# Patient Record
Sex: FEMALE | Race: WHITE | Employment: FULL TIME | URBAN - METROPOLITAN AREA
[De-identification: names, ages, dates, MRNs, and addresses within clinical notes are randomized per-mention and may not be internally consistent; named-entity substitution may affect disease eponyms.]

---

## 2017-09-27 ENCOUNTER — APPOINTMENT (OUTPATIENT)
Dept: GENERAL RADIOLOGY | Age: 22
End: 2017-09-27
Attending: EMERGENCY MEDICINE
Payer: COMMERCIAL

## 2017-09-27 ENCOUNTER — HOSPITAL ENCOUNTER (EMERGENCY)
Age: 22
Discharge: HOME | End: 2017-09-27
Attending: EMERGENCY MEDICINE
Payer: COMMERCIAL

## 2017-09-27 VITALS
DIASTOLIC BLOOD PRESSURE: 66 MMHG | SYSTOLIC BLOOD PRESSURE: 128 MMHG | WEIGHT: 270 LBS | OXYGEN SATURATION: 96 % | TEMPERATURE: 98.3 F | RESPIRATION RATE: 20 BRPM | HEIGHT: 69 IN | BODY MASS INDEX: 39.99 KG/M2 | HEART RATE: 70 BPM
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
## 2017-09-27 DIAGNOSIS — R07.89 CHEST WALL PAIN: ICD-10-CM

## 2017-09-27 DIAGNOSIS — S46.912A SHOULDER STRAIN, LEFT, INITIAL ENCOUNTER: ICD-10-CM

## 2017-09-27 PROCEDURE — 90715 TDAP VACCINE 7 YRS/> IM: CPT | Performed by: EMERGENCY MEDICINE

## 2017-09-27 PROCEDURE — 90471 IMMUNIZATION ADMIN: CPT

## 2017-09-27 PROCEDURE — 74011250637 HC RX REV CODE- 250/637: Performed by: EMERGENCY MEDICINE

## 2017-09-27 PROCEDURE — 71020 XR CHEST PA LAT: CPT

## 2017-09-27 PROCEDURE — 73030 X-RAY EXAM OF SHOULDER: CPT

## 2017-09-27 PROCEDURE — 99283 EMERGENCY DEPT VISIT LOW MDM: CPT

## 2017-09-27 PROCEDURE — 74011250636 HC RX REV CODE- 250/636: Performed by: EMERGENCY MEDICINE

## 2017-09-27 RX ORDER — CYCLOBENZAPRINE HCL 5 MG
5 TABLET ORAL
Qty: 12 TAB | Refills: 0 | Status: SHIPPED | OUTPATIENT
Start: 2017-09-27 | End: 2018-03-21

## 2017-09-27 RX ORDER — NAPROXEN 500 MG/1
500 TABLET ORAL 2 TIMES DAILY WITH MEALS
Qty: 20 TAB | Refills: 0 | Status: SHIPPED | OUTPATIENT
Start: 2017-09-27 | End: 2018-03-21

## 2017-09-27 RX ORDER — IBUPROFEN 400 MG/1
400 TABLET ORAL
Status: COMPLETED | OUTPATIENT
Start: 2017-09-27 | End: 2017-09-27

## 2017-09-27 RX ADMIN — TETANUS TOXOID, REDUCED DIPHTHERIA TOXOID AND ACELLULAR PERTUSSIS VACCINE, ADSORBED 0.5 ML: 5; 2.5; 8; 8; 2.5 SUSPENSION INTRAMUSCULAR at 12:44

## 2017-09-27 RX ADMIN — IBUPROFEN 400 MG: 400 TABLET, FILM COATED ORAL at 12:59
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
## 2017-09-27 NOTE — LETTER
[facility] EMERGENCY DEPT 
[address] [facility] [address] 
[phone] Work/School Note Date: 9/27/2017 To Whom It May concern: 
 
Sidney Fernandes was seen and treated today in the emergency room by the following provider(s): 
No providers found. Sidney Fernandes may return to work on 9/29/17. Sincerely, 
 
 
 
 
Jose Clark
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
## 2017-09-27 NOTE — DISCHARGE INSTRUCTIONS
You should followup with primary care physician in one week if you continue to have pain. Return if new weakness/numbness, problems breathing, passing out, or other worriosme smyptoms.

## 2017-09-27 NOTE — ED NOTES
Emily Salcedo is a 19 y.o. female that was discharged in stable condition. The patients diagnosis, condition and treatment were explained to  patient and aftercare instructions were given. The patient verbalized understanding. Patient armband removed and shredded.

## 2018-03-02 ENCOUNTER — HOSPITAL ENCOUNTER (EMERGENCY)
Age: 23
Discharge: HOME | End: 2018-03-02
Attending: EMERGENCY MEDICINE
Payer: SELF-PAY

## 2018-03-02 VITALS
SYSTOLIC BLOOD PRESSURE: 134 MMHG | HEART RATE: 70 BPM | BODY MASS INDEX: 38.51 KG/M2 | RESPIRATION RATE: 16 BRPM | OXYGEN SATURATION: 98 % | DIASTOLIC BLOOD PRESSURE: 69 MMHG | HEIGHT: 69 IN | TEMPERATURE: 98.3 F | WEIGHT: 260 LBS

## 2018-03-02 DIAGNOSIS — N30.00 ACUTE CYSTITIS WITHOUT HEMATURIA: Primary | ICD-10-CM

## 2018-03-02 LAB
APPEARANCE UR: ABNORMAL
BACTERIA URNS QL MICRO: NEGATIVE /HPF
BILIRUB UR QL: NEGATIVE
COLOR UR: ABNORMAL
EPITH CASTS URNS QL MICRO: NORMAL /LPF (ref 0–5)
GLUCOSE UR STRIP.AUTO-MCNC: NEGATIVE MG/DL
HCG UR QL: NEGATIVE
HGB UR QL STRIP: ABNORMAL
KETONES UR QL STRIP.AUTO: NEGATIVE MG/DL
LEUKOCYTE ESTERASE UR QL STRIP.AUTO: ABNORMAL
NITRITE UR QL STRIP.AUTO: POSITIVE
PH UR STRIP: 7.5 [PH] (ref 5–8)
PROT UR STRIP-MCNC: ABNORMAL MG/DL
RBC #/AREA URNS HPF: NORMAL /HPF (ref 0–5)
SP GR UR REFRACTOMETRY: 1.02 (ref 1–1.03)
UROBILINOGEN UR QL STRIP.AUTO: 1 EU/DL (ref 0.2–1)
WBC URNS QL MICRO: NORMAL /HPF (ref 0–4)

## 2018-03-02 PROCEDURE — 81001 URINALYSIS AUTO W/SCOPE: CPT | Performed by: PHYSICIAN ASSISTANT

## 2018-03-02 PROCEDURE — 81025 URINE PREGNANCY TEST: CPT | Performed by: PHYSICIAN ASSISTANT

## 2018-03-02 PROCEDURE — 99283 EMERGENCY DEPT VISIT LOW MDM: CPT

## 2018-03-02 RX ORDER — CEPHALEXIN 500 MG/1
500 CAPSULE ORAL 2 TIMES DAILY
Qty: 14 CAP | Refills: 0 | Status: SHIPPED | OUTPATIENT
Start: 2018-03-02 | End: 2018-03-09

## 2018-03-02 NOTE — DISCHARGE INSTRUCTIONS

## 2018-03-02 NOTE — ED PROVIDER NOTES
EMERGENCY DEPARTMENT HISTORY AND PHYSICAL EXAM    3:03 PM      Date: 3/2/2018  Patient Name: Sharad Bull    History of Presenting Illness     Chief Complaint   Patient presents with    Urinary Pain       History Provided By: Patient    Chief Complaint: UTI  Duration:  Weeks  Timing:  Acute  Location:   Quality: Aching  Severity: Mild  Modifying Factors: urostat is not improving   Associated Symptoms: nausea without vomiting, abdominal pain      Additional History (Context):Malorie Camacho is a 23 y.o. female with a pertinent history of anxiety, obesity who presents to the emergency department for evaluation of urinary frequency, hematuria, dysuria, and lower abdominal pain x 1 week. No improvement with urostat. No vaginal complaints. no back pain. Pt denies any fevers or chills, headache, dizziness or light headedness, ENT issues, CP or discomfort, SOB, cough, v/d/c, focal weakness/numbness/tingling, or rash. Patient has no other complaints at this time. PCP:  None    Current Outpatient Prescriptions   Medication Sig Dispense Refill    cephALEXin (KEFLEX) 500 mg capsule Take 1 Cap by mouth two (2) times a day for 7 days. 14 Cap 0    naproxen (NAPROSYN) 500 mg tablet Take 1 Tab by mouth two (2) times daily (with meals). 20 Tab 0    cyclobenzaprine (FLEXERIL) 5 mg tablet Take 1 Tab by mouth three (3) times daily as needed for Muscle Spasm(s). 12 Tab 0       Past History     Past Medical History:  Past Medical History:   Diagnosis Date    Anxiety disorder     Personal history of mental disorder     Rash and other nonspecific skin eruption     Severely overweight        Past Surgical History:  History reviewed. No pertinent surgical history.     Family History:  Family History   Problem Relation Age of Onset    Diabetes Father     Hypertension Father     Heart Disease Mother     No Known Problems Brother     No Known Problems Brother        Social History:  Social History   Substance Use Topics  Smoking status: Never Smoker    Smokeless tobacco: Never Used    Alcohol use No       Allergies:  No Known Allergies      Review of Systems       Review of Systems   Constitutional: Negative for chills and fever. HENT: Negative for congestion, rhinorrhea and sore throat. Respiratory: Negative for cough and shortness of breath. Cardiovascular: Negative for chest pain. Gastrointestinal: Negative for abdominal pain, blood in stool, constipation, diarrhea, nausea and vomiting. Genitourinary: Positive for dysuria, frequency and hematuria. Musculoskeletal: Negative for back pain and myalgias. Skin: Negative for rash and wound. Neurological: Negative for dizziness and headaches. Physical Exam     Visit Vitals    /69 (BP 1 Location: Left arm)    Pulse 70    Temp 98.3 °F (36.8 °C)    Resp 16    Ht 5' 9\" (1.753 m)    Wt 117.9 kg (260 lb)    SpO2 98%    BMI 38.4 kg/m2         Physical Exam   Constitutional: She is oriented to person, place, and time. She appears well-developed and well-nourished. No distress. HENT:   Head: Normocephalic and atraumatic. Eyes: Conjunctivae and EOM are normal. Right eye exhibits no discharge. Left eye exhibits no discharge. Neck: Normal range of motion. Neck supple. Cardiovascular: Normal rate, regular rhythm and normal heart sounds. Pulmonary/Chest: Effort normal and breath sounds normal. No respiratory distress. She has no wheezes. She has no rales. She exhibits no tenderness. Abdominal: Soft. Bowel sounds are normal. She exhibits no distension. There is no tenderness. There is no rebound and no guarding. Musculoskeletal: She exhibits no edema or deformity. Neurological: She is alert and oriented to person, place, and time. She has normal reflexes. Skin: Skin is warm and dry. She is not diaphoretic. Psychiatric: She has a normal mood and affect. Nursing note and vitals reviewed.         Diagnostic Study Results     Labs -  Recent Results (from the past 12 hour(s))   URINALYSIS W/ RFLX MICROSCOPIC    Collection Time: 03/02/18  3:10 PM   Result Value Ref Range    Color DARK YELLOW      Appearance CLOUDY      Specific gravity 1.016 1.005 - 1.030      pH (UA) 7.5 5.0 - 8.0      Protein TRACE (A) NEG mg/dL    Glucose NEGATIVE  NEG mg/dL    Ketone NEGATIVE  NEG mg/dL    Bilirubin NEGATIVE  NEG      Blood TRACE (A) NEG      Urobilinogen 1.0 0.2 - 1.0 EU/dL    Nitrites POSITIVE (A) NEG      Leukocyte Esterase MODERATE (A) NEG     HCG URINE, QL    Collection Time: 03/02/18  3:10 PM   Result Value Ref Range    HCG urine, QL NEGATIVE  NEG     URINE MICROSCOPIC ONLY    Collection Time: 03/02/18  3:10 PM   Result Value Ref Range    WBC 11 to 20 0 - 4 /hpf    RBC 0 to 3 0 - 5 /hpf    Epithelial cells FEW 0 - 5 /lpf    Bacteria NEGATIVE  NEG /hpf       Radiologic Studies -   No results found. Medical Decision Making   I am the first provider for this patient. I reviewed the vital signs, available nursing notes, past medical history, past surgical history, family history and social history. Vital Signs-Reviewed the patient's vital signs. Pulse Oximetry Analysis -  98% on room air (Interpretation)    Records Reviewed: Nursing Notes and Old Medical Records (Time of Review: 3:03 PM)    ED Course: Progress Notes, Reevaluation, and Consults:    Provider Notes (Medical Decision Making):   DDX:  UTI, pyelo, urethritis, contact dermatitis, vaginitis    Plan: UA c/w infection. Will treat with keflex. At this time, patient is stable and appropriate for discharge home. Patient demonstrates understanding of current diagnoses and is in agreement with the treatment plan. They are advised that while the likelihood of serious underlying condition is low at this point given the evaluation performed today, we cannot fully rule it out. They are advised to immediately return with any new symptoms or worsening of current condition.   All questions have been answered. Patient is given educational material regarding their diagnoses, including danger symptoms and when to return to the ED. Diagnosis     Clinical Impression:   1. Acute cystitis without hematuria        Disposition: ND Home    Follow-up Information     Follow up With Details Comments Lam Call in 2 days For follow-up [facility] [id number] [phone]    [facility] EMERGENCY DEPT Go to As needed, If symptoms worsen [address]  [phone]           Patient's Medications   Start Taking    CEPHALEXIN (KEFLEX) 500 MG CAPSULE    Take 1 Cap by mouth two (2) times a day for 7 days. Continue Taking    CYCLOBENZAPRINE (FLEXERIL) 5 MG TABLET    Take 1 Tab by mouth three (3) times daily as needed for Muscle Spasm(s). NAPROXEN (NAPROSYN) 500 MG TABLET    Take 1 Tab by mouth two (2) times daily (with meals).    These Medications have changed    No medications on file   Stop Taking    No medications on file     _______________________________

## 2018-03-02 NOTE — ED NOTES
Current Discharge Medication List      START taking these medications    Details   cephALEXin (KEFLEX) 500 mg capsule Take 1 Cap by mouth two (2) times a day for 7 days. Qty: 14 Cap, Refills: 0         CONTINUE these medications which have NOT CHANGED    Details   naproxen (NAPROSYN) 500 mg tablet Take 1 Tab by mouth two (2) times daily (with meals). Qty: 20 Tab, Refills: 0      cyclobenzaprine (FLEXERIL) 5 mg tablet Take 1 Tab by mouth three (3) times daily as needed for Muscle Spasm(s). Qty: 12 Tab, Refills: 0           Patient armband removed and shredded  Prescriptions given and reviewed with patient.

## 2018-03-21 ENCOUNTER — HOSPITAL ENCOUNTER (EMERGENCY)
Age: 23
Discharge: HOME | End: 2018-03-21
Attending: EMERGENCY MEDICINE
Payer: SELF-PAY

## 2018-03-21 VITALS
WEIGHT: 260 LBS | HEART RATE: 92 BPM | RESPIRATION RATE: 18 BRPM | BODY MASS INDEX: 38.51 KG/M2 | TEMPERATURE: 98.2 F | OXYGEN SATURATION: 96 % | SYSTOLIC BLOOD PRESSURE: 133 MMHG | DIASTOLIC BLOOD PRESSURE: 73 MMHG | HEIGHT: 69 IN

## 2018-03-21 DIAGNOSIS — Z32.01 PREGNANCY TEST PERFORMED, PREGNANCY CONFIRMED: Primary | ICD-10-CM

## 2018-03-21 DIAGNOSIS — R82.71 BACTERIURIA: ICD-10-CM

## 2018-03-21 LAB
APPEARANCE UR: CLEAR
BACTERIA URNS QL MICRO: ABNORMAL /HPF
BILIRUB UR QL: NEGATIVE
COLOR UR: YELLOW
EPITH CASTS URNS QL MICRO: ABNORMAL /LPF (ref 0–5)
GLUCOSE UR STRIP.AUTO-MCNC: NEGATIVE MG/DL
HCG UR QL: POSITIVE
HGB UR QL STRIP: ABNORMAL
KETONES UR QL STRIP.AUTO: ABNORMAL MG/DL
LEUKOCYTE ESTERASE UR QL STRIP.AUTO: ABNORMAL
MUCOUS THREADS URNS QL MICRO: ABNORMAL /LPF
NITRITE UR QL STRIP.AUTO: NEGATIVE
PH UR STRIP: 6.5 [PH] (ref 5–8)
PROT UR STRIP-MCNC: 30 MG/DL
RBC #/AREA URNS HPF: ABNORMAL /HPF (ref 0–5)
SERVICE CMNT-IMP: NORMAL
SP GR UR REFRACTOMETRY: 1.03 (ref 1–1.03)
UROBILINOGEN UR QL STRIP.AUTO: 1 EU/DL (ref 0.2–1)
WBC URNS QL MICRO: ABNORMAL /HPF (ref 0–4)
WET PREP GENITAL: NORMAL

## 2018-03-21 PROCEDURE — 87491 CHLMYD TRACH DNA AMP PROBE: CPT | Performed by: PHYSICIAN ASSISTANT

## 2018-03-21 PROCEDURE — 81025 URINE PREGNANCY TEST: CPT | Performed by: PHYSICIAN ASSISTANT

## 2018-03-21 PROCEDURE — 87086 URINE CULTURE/COLONY COUNT: CPT | Performed by: PHYSICIAN ASSISTANT

## 2018-03-21 PROCEDURE — 87077 CULTURE AEROBIC IDENTIFY: CPT | Performed by: PHYSICIAN ASSISTANT

## 2018-03-21 PROCEDURE — 87186 SC STD MICRODIL/AGAR DIL: CPT | Performed by: PHYSICIAN ASSISTANT

## 2018-03-21 PROCEDURE — 87210 SMEAR WET MOUNT SALINE/INK: CPT | Performed by: PHYSICIAN ASSISTANT

## 2018-03-21 PROCEDURE — 81001 URINALYSIS AUTO W/SCOPE: CPT | Performed by: PHYSICIAN ASSISTANT

## 2018-03-21 PROCEDURE — 99284 EMERGENCY DEPT VISIT MOD MDM: CPT

## 2018-03-21 RX ORDER — CEPHALEXIN 500 MG/1
500 CAPSULE ORAL 2 TIMES DAILY
Qty: 14 CAP | Refills: 0 | Status: SHIPPED | OUTPATIENT
Start: 2018-03-21 | End: 2018-03-28

## 2018-03-21 NOTE — LETTER
3/24/2018 2:03 PM 
 
Ms. Isis Batista [address] [place] [address] Drive [address] [address] Dear Ms. Sagar King: The results of your lab work performed in our office were abnormal and we have had difficulty reaching you by telephone. Please contact our office as soon as possible to discuss these results. Sincerely, MARTHA Mcgee

## 2018-03-21 NOTE — Clinical Note
Take medication as prescribed. Follow-up with your primary care physician in 2 days for reassessment. Bring the results from this visit with your for their review. Return to the ED for any new, worsening, or persistent symptoms, including fever, vomiting , abdominal pain, or any other medical concerns.
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
## 2018-03-22 NOTE — ED PROVIDER NOTES
EMERGENCY DEPARTMENT HISTORY AND PHYSICAL EXAM    8:49 PM      Date: 3/21/2018  Patient Name: Leandro Graham    History of Presenting Illness     Chief Complaint   Patient presents with    Headache    Pregnancy Test    Vaginal Discharge         History Provided By: Patient    Chief Complaint: vaginal discharge, nausea  Duration:  Days  Timing:  Acute  Location: vaginal  Quality: n/a  Severity: Mild  Modifying Factors: none  Associated Symptoms: denies any other associated signs or symptoms      Additional History (Context): Leandro Graham is a 23 y.o. female with anxiety who presents with c/o vaginal discharge and nausea x 2 days. Pt notes she is concerned for pregnancy. Did not take a pregnancy test at home. Denies fever/chills, abdominal pain, vomiting, dysuria, hematuria, vaginal bleeding. LMP 18, . PCP: None        Past History     Past Medical History:  Past Medical History:   Diagnosis Date    Anxiety disorder     Personal history of mental disorder     Rash and other nonspecific skin eruption     Severely overweight        Past Surgical History:  History reviewed. No pertinent surgical history. Family History:  Family History   Problem Relation Age of Onset    Diabetes Father     Hypertension Father     Heart Disease Mother     No Known Problems Brother     No Known Problems Brother        Social History:  Social History   Substance Use Topics    Smoking status: Never Smoker    Smokeless tobacco: Never Used    Alcohol use No       Allergies:  No Known Allergies      Review of Systems       Review of Systems   Constitutional: Negative for chills and fever. Respiratory: Negative for shortness of breath. Cardiovascular: Negative for chest pain. Gastrointestinal: Positive for nausea. Negative for abdominal pain and vomiting. Genitourinary: Positive for vaginal discharge. Negative for dysuria, flank pain and vaginal bleeding. Skin: Negative for rash.    Neurological: Negative for weakness. All other systems reviewed and are negative. Physical Exam     Visit Vitals    /73 (BP 1 Location: Left arm, BP Patient Position: At rest)    Pulse 92    Temp 98.2 °F (36.8 °C)    Resp 18    Ht 5' 9\" (1.753 m)    Wt 117.9 kg (260 lb)    LMP 02/18/2018    SpO2 96%    Breastfeeding No    BMI 38.4 kg/m2         Physical Exam   Constitutional: She appears well-developed and well-nourished. No distress. HENT:   Head: Normocephalic and atraumatic. Neck: Normal range of motion. Neck supple. Cardiovascular: Normal rate, regular rhythm and normal heart sounds. Exam reveals no gallop and no friction rub. No murmur heard. Pulmonary/Chest: Effort normal and breath sounds normal. No respiratory distress. She has no wheezes. She has no rales. Abdominal: Soft. Bowel sounds are normal. She exhibits no distension and no mass. There is no tenderness. There is no rebound and no guarding. Genitourinary:   Genitourinary Comments: See pelvic procedure   Neurological: She is alert. Skin: Skin is warm. No rash noted. She is not diaphoretic. Nursing note and vitals reviewed.         Diagnostic Study Results     Labs -  Recent Results (from the past 12 hour(s))   URINALYSIS W/ RFLX MICROSCOPIC    Collection Time: 03/21/18  8:45 PM   Result Value Ref Range    Color YELLOW      Appearance CLEAR      Specific gravity 1.028 1.005 - 1.030      pH (UA) 6.5 5.0 - 8.0      Protein 30 (A) NEG mg/dL    Glucose NEGATIVE  NEG mg/dL    Ketone TRACE (A) NEG mg/dL    Bilirubin NEGATIVE  NEG      Blood TRACE (A) NEG      Urobilinogen 1.0 0.2 - 1.0 EU/dL    Nitrites NEGATIVE  NEG      Leukocyte Esterase MODERATE (A) NEG     HCG URINE, QL    Collection Time: 03/21/18  8:45 PM   Result Value Ref Range    HCG urine, QL POSITIVE (A) NEG     URINE MICROSCOPIC ONLY    Collection Time: 03/21/18  8:45 PM   Result Value Ref Range    WBC 11 to 20 0 - 4 /hpf    RBC 0 to 3 0 - 5 /hpf    Epithelial cells 3+ 0 - 5 /lpf    Bacteria 1+ (A) NEG /hpf    Mucus 2+ (A) NEG /lpf   WET PREP    Collection Time: 03/21/18  9:20 PM   Result Value Ref Range    Special Requests: NO SPECIAL REQUESTS      Wet prep NO YEAST,TRICHOMONAS OR CLUE CELLS NOTED         Radiologic Studies -   No orders to display         Medical Decision Making   I am the first provider for this patient. I reviewed the vital signs, available nursing notes, past medical history, past surgical history, family history and social history. Vital Signs-Reviewed the patient's vital signs. Pulse Oximetry Analysis -  96 on room air     Records Reviewed: Nursing Notes and Old Medical Records (Time of Review: 8:49 PM)    ED Course: Progress Notes, Reevaluation, and Consults:  9:54 PM: Reviewed results with patient. Will place  consult to help facilitate follow-up and insurance. Provider Notes (Medical Decision Making): 24 yo F who presents due to vaginal discharge and nausea x 2 days. Afebrile, VS stable, looks well. No abdominal tenderness. Pelvic examination without CMT or adnexal tenderness. + pregnancy test without vaginal bleeding or abdominal pain. Will d/c with Keflex for bacteruria in pregnancy and send urine culture. Will prescribe prenatal vitamins and close follow-up. Procedures: Pelvic Exam  Date/Time: 3/21/2018 8:54 PM  Performed by: PA  Procedure duration:  5 minutes. Documented by:  Gregory Kline. Exam assisted by:  Nurse Jie Villareal. Type of exam performed: speculum. External genitalia appearance: normal.    Vaginal exam:  discharge. The amount of discharge was:  moderate. The discharge was grey and mucoid. Cervical exam:  normal.    Specimen(s) collected:  chlamydia, GC and vaginal culture. Bimanual exam:  normal.    Patient tolerance: Patient tolerated the procedure well with no immediate complications              Diagnosis     Clinical Impression:   1. Pregnancy test performed, pregnancy confirmed    2. Bacteriuria        Disposition: home     Follow-up Information     Follow up With Details Comments Contact Info    [facility] EMERGENCY DEPT  If symptoms worsen [address] [id number]  [address] [facility] Call for help insurance and follow-up [facility] [address] [place] [address]    Caitlin Raman [address] [phone]             Patient's Medications   Start Taking    CEPHALEXIN (KEFLEX) 500 MG CAPSULE    Take 1 Cap by mouth two (2) times a day for 7 days. PRENATAL MULTIVIT-CA-MIN-FE-FA (PRENATAL VITAMIN) TAB    Take 1 Tab by mouth daily. Continue Taking    No medications on file   These Medications have changed    No medications on file   Stop Taking    CYCLOBENZAPRINE (FLEXERIL) 5 MG TABLET    Take 1 Tab by mouth three (3) times daily as needed for Muscle Spasm(s). NAPROXEN (NAPROSYN) 500 MG TABLET    Take 1 Tab by mouth two (2) times daily (with meals).

## 2018-03-23 LAB
C TRACH RRNA SPEC QL NAA+PROBE: POSITIVE
N GONORRHOEA RRNA SPEC QL NAA+PROBE: NEGATIVE
SPECIMEN SOURCE: ABNORMAL

## 2018-03-24 LAB
BACTERIA SPEC CULT: ABNORMAL
BACTERIA SPEC CULT: ABNORMAL
SERVICE CMNT-IMP: ABNORMAL

## 2018-03-24 RX ORDER — AZITHROMYCIN 250 MG/1
TABLET, FILM COATED ORAL
Qty: 4 TAB | Refills: 0 | Status: SHIPPED | OUTPATIENT
Start: 2018-03-24 | End: 2018-03-29

## 2018-03-24 NOTE — ED NOTES
Pt returned call and I have escribed azithromycin 1,000mg for her chlamydia. Strict instructions to f/u with OBGYN since she is pregnant.

## 2018-04-05 NOTE — ED NOTES
The L[facility] contacted the client by phone to discuss their follow up care. The [facility] scheduled the clients appointment with the [facility]. The appointment is on Thursday, April 26, 2018 @ 12 noon. The client will be seen by Dr. Mortimer Parma.

## 2018-04-07 ENCOUNTER — APPOINTMENT (OUTPATIENT)
Dept: ULTRASOUND IMAGING | Age: 23
End: 2018-04-07
Attending: NURSE PRACTITIONER
Payer: MEDICAID

## 2018-04-07 ENCOUNTER — HOSPITAL ENCOUNTER (EMERGENCY)
Age: 23
Discharge: HOME | End: 2018-04-07
Attending: EMERGENCY MEDICINE
Payer: MEDICAID

## 2018-04-07 VITALS
TEMPERATURE: 98.1 F | SYSTOLIC BLOOD PRESSURE: 125 MMHG | DIASTOLIC BLOOD PRESSURE: 48 MMHG | OXYGEN SATURATION: 98 % | HEART RATE: 79 BPM | BODY MASS INDEX: 38.51 KG/M2 | WEIGHT: 260 LBS | RESPIRATION RATE: 18 BRPM | HEIGHT: 69 IN

## 2018-04-07 DIAGNOSIS — N30.00 ACUTE CYSTITIS WITHOUT HEMATURIA: ICD-10-CM

## 2018-04-07 DIAGNOSIS — Z34.91 NORMAL INTRAUTERINE PREGNANCY ON PRENATAL ULTRASOUND IN FIRST TRIMESTER: Primary | ICD-10-CM

## 2018-04-07 DIAGNOSIS — N76.0 BV (BACTERIAL VAGINOSIS): ICD-10-CM

## 2018-04-07 DIAGNOSIS — B96.89 BV (BACTERIAL VAGINOSIS): ICD-10-CM

## 2018-04-07 LAB
ANION GAP SERPL CALC-SCNC: 12 MMOL/L (ref 3–18)
APPEARANCE UR: CLEAR
BASOPHILS # BLD: 0 K/UL (ref 0–0.06)
BASOPHILS NFR BLD: 0 % (ref 0–2)
BILIRUB UR QL: NEGATIVE
BUN SERPL-MCNC: 9 MG/DL (ref 7–18)
BUN/CREAT SERPL: 16 (ref 12–20)
CALCIUM SERPL-MCNC: 9.2 MG/DL (ref 8.5–10.1)
CHLORIDE SERPL-SCNC: 102 MMOL/L (ref 100–108)
CO2 SERPL-SCNC: 24 MMOL/L (ref 21–32)
COLOR UR: YELLOW
CREAT SERPL-MCNC: 0.55 MG/DL (ref 0.6–1.3)
DIFFERENTIAL METHOD BLD: NORMAL
EOSINOPHIL # BLD: 0.2 K/UL (ref 0–0.4)
EOSINOPHIL NFR BLD: 2 % (ref 0–5)
EPITH CASTS URNS QL MICRO: NORMAL /LPF (ref 0–5)
ERYTHROCYTE [DISTWIDTH] IN BLOOD BY AUTOMATED COUNT: 12.7 % (ref 11.6–14.5)
GLUCOSE SERPL-MCNC: 88 MG/DL (ref 74–99)
GLUCOSE UR STRIP.AUTO-MCNC: NEGATIVE MG/DL
HCG SERPL-ACNC: ABNORMAL MIU/ML (ref 1–6)
HCT VFR BLD AUTO: 42.3 % (ref 35–45)
HGB BLD-MCNC: 14.2 G/DL (ref 12–16)
HGB UR QL STRIP: ABNORMAL
KETONES UR QL STRIP.AUTO: NEGATIVE MG/DL
LEUKOCYTE ESTERASE UR QL STRIP.AUTO: ABNORMAL
LYMPHOCYTES # BLD: 3.6 K/UL (ref 0.9–3.6)
LYMPHOCYTES NFR BLD: 32 % (ref 21–52)
MCH RBC QN AUTO: 28.2 PG (ref 24–34)
MCHC RBC AUTO-ENTMCNC: 33.6 G/DL (ref 31–37)
MCV RBC AUTO: 83.9 FL (ref 74–97)
MONOCYTES # BLD: 1 K/UL (ref 0.05–1.2)
MONOCYTES NFR BLD: 9 % (ref 3–10)
NEUTS SEG # BLD: 6.4 K/UL (ref 1.8–8)
NEUTS SEG NFR BLD: 57 % (ref 40–73)
NITRITE UR QL STRIP.AUTO: NEGATIVE
PH UR STRIP: 5 [PH] (ref 5–8)
PLATELET # BLD AUTO: 313 K/UL (ref 135–420)
PMV BLD AUTO: 9.3 FL (ref 9.2–11.8)
POTASSIUM SERPL-SCNC: 3.5 MMOL/L (ref 3.5–5.5)
PROT UR STRIP-MCNC: NEGATIVE MG/DL
RBC # BLD AUTO: 5.04 M/UL (ref 4.2–5.3)
SERVICE CMNT-IMP: NORMAL
SODIUM SERPL-SCNC: 138 MMOL/L (ref 136–145)
SP GR UR REFRACTOMETRY: 1.02 (ref 1–1.03)
UROBILINOGEN UR QL STRIP.AUTO: 0.2 EU/DL (ref 0.2–1)
WBC # BLD AUTO: 11.2 K/UL (ref 4.6–13.2)
WBC URNS QL MICRO: NORMAL /HPF (ref 0–4)
WET PREP GENITAL: NORMAL

## 2018-04-07 PROCEDURE — 74011000250 HC RX REV CODE- 250: Performed by: NURSE PRACTITIONER

## 2018-04-07 PROCEDURE — 80048 BASIC METABOLIC PNL TOTAL CA: CPT | Performed by: NURSE PRACTITIONER

## 2018-04-07 PROCEDURE — 99284 EMERGENCY DEPT VISIT MOD MDM: CPT

## 2018-04-07 PROCEDURE — 76817 TRANSVAGINAL US OBSTETRIC: CPT

## 2018-04-07 PROCEDURE — 74011250636 HC RX REV CODE- 250/636: Performed by: NURSE PRACTITIONER

## 2018-04-07 PROCEDURE — 85025 COMPLETE CBC W/AUTO DIFF WBC: CPT | Performed by: NURSE PRACTITIONER

## 2018-04-07 PROCEDURE — 96374 THER/PROPH/DIAG INJ IV PUSH: CPT

## 2018-04-07 PROCEDURE — 87491 CHLMYD TRACH DNA AMP PROBE: CPT | Performed by: NURSE PRACTITIONER

## 2018-04-07 PROCEDURE — 81001 URINALYSIS AUTO W/SCOPE: CPT | Performed by: NURSE PRACTITIONER

## 2018-04-07 PROCEDURE — 84702 CHORIONIC GONADOTROPIN TEST: CPT | Performed by: NURSE PRACTITIONER

## 2018-04-07 PROCEDURE — 87086 URINE CULTURE/COLONY COUNT: CPT | Performed by: NURSE PRACTITIONER

## 2018-04-07 PROCEDURE — 87210 SMEAR WET MOUNT SALINE/INK: CPT | Performed by: NURSE PRACTITIONER

## 2018-04-07 RX ORDER — METRONIDAZOLE 500 MG/1
500 TABLET ORAL 2 TIMES DAILY
Qty: 14 TAB | Refills: 0 | Status: SHIPPED | OUTPATIENT
Start: 2018-04-07 | End: 2018-04-14

## 2018-04-07 RX ORDER — CEPHALEXIN 500 MG/1
500 CAPSULE ORAL 2 TIMES DAILY
Qty: 14 CAP | Refills: 0 | Status: SHIPPED | OUTPATIENT
Start: 2018-04-07 | End: 2018-04-14

## 2018-04-07 RX ADMIN — WATER 1 G: 1 INJECTION INTRAMUSCULAR; INTRAVENOUS; SUBCUTANEOUS at 15:40

## 2018-04-07 NOTE — LETTER
[address] [place] [address] [facility] EMERGENCY DEPT 
[address] [facility] [id number] [phone] Work/School Note Date: 4/7/2018 To Whom It May concern: 
 
Matias Echols was seen and treated today in the emergency room by the following provider(s): 
Attending Provider: Bridger Cano DO 
Nurse Practitioner: Des Pratt NP. Matias Echols may return to work on 04/08/2018. Sincerely, Des Pratt NP

## 2018-04-07 NOTE — ED NOTES
I have reviewed discharge instructions with the patient. The patient verbalized understanding. Current Discharge Medication List      START taking these medications    Details   metroNIDAZOLE (FLAGYL) 500 mg tablet Take 1 Tab by mouth two (2) times a day for 7 days. Qty: 14 Tab, Refills: 0      cephALEXin (KEFLEX) 500 mg capsule Take 1 Cap by mouth two (2) times a day for 7 days.   Qty: 14 Cap, Refills: 0         Patient armband removed and shredded

## 2018-04-07 NOTE — LETTER
[facility] EMERGENCY DEPT 
[address] [facility] [id number] 
[phone] Work/School Note Date: 4/7/2018 To Whom It May concern: 
 
Brody Jones was seen and treated today in the emergency room by the following provider(s): 
Attending Provider: Vane Laguerre DO 
Nurse Practitioner: Zane Alexis NP. Brody Jonse may return to work on 04/09/2018. Sincerely, Zane Alexis NP

## 2018-04-07 NOTE — ED PROVIDER NOTES
HPI Comments: 2:50 PM   23 y.o. female presents to ED C/O lower abdominal pain, pregnancy problem. Patient has a HX of obesity, anxiety, STIs. Patient reports she is approx 7 weeks pregnant, LMP 2/15. Patient reports lower abdominal cramping constant since last night. Patient denies dysuria, fever, hematuria, vaginal bleeding. Patient reports treated for chlamydia 2 months ago. Patient reports clear/ white discharge. No previous imagining during this pregnancy. . Patient is a nonsmoker. Patient denies any other symptoms or complaints. The history is provided by the patient. History limited by: No language barrier         Past Medical History:   Diagnosis Date    Anxiety disorder     Personal history of mental disorder     Rash and other nonspecific skin eruption     Severely overweight        History reviewed. No pertinent surgical history. Family History:   Problem Relation Age of Onset    Diabetes Father     Hypertension Father     Heart Disease Mother     No Known Problems Brother     No Known Problems Brother        Social History     Social History    Marital status: SINGLE     Spouse name: N/A    Number of children: 1    Years of education: 15     Occupational History    Not on file. Social History Main Topics    Smoking status: Never Smoker    Smokeless tobacco: Never Used    Alcohol use No    Drug use: No    Sexual activity: No     Other Topics Concern    Not on file     Social History Narrative         ALLERGIES: Review of patient's allergies indicates no known allergies. Review of Systems   Constitutional: Negative for appetite change and fever. HENT: Negative for congestion, rhinorrhea and sore throat. Respiratory: Negative for cough, shortness of breath and wheezing. Cardiovascular: Negative for chest pain and leg swelling. Gastrointestinal: Positive for nausea. Negative for abdominal pain, constipation, diarrhea and vomiting.    Genitourinary: Positive for pelvic pain and vaginal discharge. Negative for dysuria. Musculoskeletal: Negative for arthralgias and back pain. Neurological: Negative for dizziness, syncope and headaches. All other systems reviewed and are negative. Vitals:    04/07/18 1446   BP: 125/48   Pulse: 79   Resp: 18   Temp: 98.1 °F (36.7 °C)   SpO2: 98%   Weight: 117.9 kg (260 lb)   Height: 5' 9\" (1.753 m)            Physical Exam   Constitutional: She is oriented to person, place, and time. She appears well-developed and well-nourished. No distress. Eyes: Conjunctivae and EOM are normal.   Cardiovascular: Normal rate, regular rhythm and normal heart sounds. Pulmonary/Chest: Effort normal and breath sounds normal. No respiratory distress. She has no wheezes. She has no rales. Abdominal: Soft. Normal appearance and bowel sounds are normal. There is tenderness in the right lower quadrant, suprapubic area and left lower quadrant. There is no rigidity, no rebound, no guarding and no CVA tenderness. Genitourinary:   Genitourinary Comments: Please see pelvic exam   Musculoskeletal: Normal range of motion. Neurological: She is alert and oriented to person, place, and time. She exhibits normal muscle tone. Coordination normal.   Skin: Skin is warm and dry. No rash noted. She is not diaphoretic. No erythema. No pallor. Nursing note and vitals reviewed.        MDM  Number of Diagnoses or Management Options  Acute cystitis without hematuria:   BV (bacterial vaginosis):   Normal intrauterine pregnancy on prenatal ultrasound in first trimester:   Diagnosis management comments: Differential Diagnosis: appendicitis, ectopic pregnancy, normal pregnancy, TOA, PID, ovarian cyst, endometriosis, endometritis, uterine fibroids, constipation, gastroenteritis, IBS, IBD, celiac disease, diverticulitis, nephrolithiasis, pyelonephritis, cystitis, mesenteric ischemia, mesenteric adenitis, ruptured AAA, SBO, LBO    Plan: CBC, BMP, UA, pelvic exam, patient is O negative, rhogam needed if bleeding. HCG qt. Ultrasound to evaluate for ectopic  Progress - evidence of UTI, urine culture ordered,rocephine in department and discharge with keflex, no concerning CBC or BM findings, moderate clue cells noted on wet prep, due to possible affect on pregnancy will treat for BV with falgyl, patient denies symptoms associated with yeast infection, will defer to OBGYN as needed. Progress - CBC wero, moderate leuks WBC 21-35, urine culture ordered and rocephin for UTI  Ultrasound - FINDINGS: The uterus measures 10.1 x 5.2 x 5.8 cm. Within the uterus, a  gestational sac is identified. Within the gestational sac, a yolk sac is  defined. A fetal pole is identified. The fetal pole measures 0.6 cm. This is  consistent with an estimated gestational age of 7 weeks 3 days. This gives an  estimated date of confinement of 11/28/2018. Fetal cardiac activity is detected  measuring 138 bpm. Due to the fetal age, it is too early to evaluate the  amniotic fluid and placenta.     No fluid appreciated in the pelvis.      The right ovary measures 4.5 x 4.9 x 4.5 cm. Normal color and spectral Doppler  identified. No complication identified.      The left ovary measures 4.5 x 4.5 x 4.3 cm. Normal color and spectral Doppler  identified. No complication identified. 6:04 PM Patient is aware of results, will treat for cystitis and BV, referral to OBGYN for further evaluation, vaginal cultures pending, concerning for possible STI infection, will call with results. Patient treated for STI 1 month ago. Patient educated to return to the ED for any new or worsening symptoms. Questions denied. Amount and/or Complexity of Data Reviewed  Clinical lab tests: ordered and reviewed  Tests in the radiology section of CPT®: ordered and reviewed          ED Course       Pelvic Exam  Date/Time: 4/7/2018 3:05 PM  Performed by: NP  Chaperone: Swetha Ho.   Type of exam performed: bimanual and speculum. External genitalia appearance: normal.    Vaginal exam:  discharge. The amount of discharge was:  moderate. The discharge was white. Cervical exam:  discharge from cervix and no cervical motion tenderness. Specimen(s) collected:  chlamydia, GC and vaginal culture.   Patient tolerance: Patient tolerated the procedure well with no immediate complications              RESULTS:    US UTS TRANSVAGINAL OB   Final Result          Labs Reviewed   URINALYSIS W/ RFLX MICROSCOPIC - Abnormal; Notable for the following:        Result Value    Blood SMALL (*)     Leukocyte Esterase MODERATE (*)     All other components within normal limits   METABOLIC PANEL, BASIC - Abnormal; Notable for the following:     Creatinine 0.55 (*)     All other components within normal limits   BETA HCG, QT - Abnormal; Notable for the following:     [place] [address] (*)     All other components within normal limits   WET PREP   CULTURE, URINE   CBC WITH AUTOMATED DIFF   CHLAMYDIA/NEISSERIA AMPLIFICATION   URINE MICROSCOPIC ONLY       Recent Results (from the past 12 hour(s))   URINALYSIS W/ RFLX MICROSCOPIC    Collection Time: 04/07/18  3:10 PM   Result Value Ref Range    Color YELLOW      Appearance CLEAR      Specific gravity 1.018 1.005 - 1.030      pH (UA) 5.0 5.0 - 8.0      Protein NEGATIVE  NEG mg/dL    Glucose NEGATIVE  NEG mg/dL    Ketone NEGATIVE  NEG mg/dL    Bilirubin NEGATIVE  NEG      Blood SMALL (A) NEG      Urobilinogen 0.2 0.2 - 1.0 EU/dL    Nitrites NEGATIVE  NEG      Leukocyte Esterase MODERATE (A) NEG     CBC WITH AUTOMATED DIFF    Collection Time: 04/07/18  3:10 PM   Result Value Ref Range    WBC 11.2 4.6 - 13.2 K/uL    RBC 5.04 4.20 - 5.30 M/uL    HGB 14.2 12.0 - 16.0 g/dL    HCT 42.3 35.0 - 45.0 %    MCV 83.9 74.0 - 97.0 FL    MCH 28.2 24.0 - 34.0 PG    MCHC 33.6 31.0 - 37.0 g/dL    RDW 12.7 11.6 - 14.5 %    PLATELET 228 609 - 202 K/uL    MPV 9.3 9.2 - 11.8 FL    NEUTROPHILS 57 40 - 73 %    LYMPHOCYTES 32 21 - 52 %    MONOCYTES 9 3 - 10 %    EOSINOPHILS 2 0 - 5 %    BASOPHILS 0 0 - 2 %    ABS. NEUTROPHILS 6.4 1.8 - 8.0 K/UL    ABS. LYMPHOCYTES 3.6 0.9 - 3.6 K/UL    ABS. MONOCYTES 1.0 0.05 - 1.2 K/UL    ABS. EOSINOPHILS 0.2 0.0 - 0.4 K/UL    ABS. BASOPHILS 0.0 0.0 - 0.06 K/UL    DF AUTOMATED     METABOLIC PANEL, BASIC    Collection Time: 04/07/18  3:10 PM   Result Value Ref Range    Sodium 138 136 - 145 mmol/L    Potassium 3.5 3.5 - 5.5 mmol/L    Chloride 102 100 - 108 mmol/L    CO2 24 21 - 32 mmol/L    Anion gap 12 3.0 - 18 mmol/L    Glucose 88 74 - 99 mg/dL    BUN 9 7.0 - 18 MG/DL    Creatinine 0.55 (L) 0.6 - 1.3 MG/DL    BUN/Creatinine ratio 16 12 - 20      GFR est AA >60 >60 ml/min/1.73m2    GFR est non-AA >60 >60 ml/min/1.73m2    Calcium 9.2 8.5 - 10.1 MG/DL   BETA HCG, QT    Collection Time: 04/07/18  3:10 PM   Result Value [place] [address] (H) 1.0 - 6.0 MIU/ML   WET PREP    Collection Time: 04/07/18  3:10 PM   Result Value Ref Range    Special Requests: NO SPECIAL REQUESTS      Wet prep NO TRICHOMONAS SEEN      Wet prep YEAST  FEW        Wet prep CLUE CELLS PRESENT  MODERATE       URINE MICROSCOPIC ONLY    Collection Time: 04/07/18  3:10 PM   Result Value Ref Range    WBC 21 to 35 0 - 4 /hpf    Epithelial cells 2+ 0 - 5 /lpf       PROGRESS NOTE:   2:50 PM  Initial assessment completed. Written by Alfrieda Spurling NP-C    DISCHARGE NOTE:  6:06 PM   Wili Flower's  results have been reviewed with her. She has been counseled regarding her diagnosis, treatment, and plan. She verbally conveys understanding and agreement of the signs, symptoms, diagnosis, treatment and prognosis and additionally agrees to follow up as discussed. She also agrees with the care-plan and conveys that all of her questions have been answered.   I have also provided discharge instructions for her that include: educational information regarding their diagnosis and treatment, and list of reasons why they would want to return to the ED prior to their follow-up appointment, should her condition change. CLINICAL IMPRESSION:    1. Normal intrauterine pregnancy on prenatal ultrasound in first trimester    2. BV (bacterial vaginosis)    3. Acute cystitis without hematuria        AFTER VISIT PLAN:    Current Discharge Medication List      START taking these medications    Details   metroNIDAZOLE (FLAGYL) 500 mg tablet Take 1 Tab by mouth two (2) times a day for 7 days. Qty: 14 Tab, Refills: 0      cephALEXin (KEFLEX) 500 mg capsule Take 1 Cap by mouth two (2) times a day for 7 days.   Qty: 14 Cap, Refills: 0              Follow-up Information     Follow up With Details Comments Contact Info    Bismark Sy MD Schedule an appointment as soon as possible for a visit in 1 week Further evaluation [address] [place] [address] [id number] [phone]             Written by Leonardo ABDALLA

## 2018-04-09 LAB
BACTERIA SPEC CULT: NORMAL
C TRACH RRNA SPEC QL NAA+PROBE: NEGATIVE
N GONORRHOEA RRNA SPEC QL NAA+PROBE: NEGATIVE
SERVICE CMNT-IMP: NORMAL
SPECIMEN SOURCE: NORMAL

## 2018-12-02 PROBLEM — Z34.90 PREGNANCY: Status: ACTIVE | Noted: 2018-12-02

## 2019-07-15 ENCOUNTER — HOSPITAL ENCOUNTER (EMERGENCY)
Age: 24
Discharge: HOME | End: 2019-07-15
Attending: EMERGENCY MEDICINE
Payer: COMMERCIAL

## 2019-07-15 VITALS
TEMPERATURE: 98.6 F | HEIGHT: 69 IN | BODY MASS INDEX: 38.51 KG/M2 | RESPIRATION RATE: 18 BRPM | HEART RATE: 71 BPM | OXYGEN SATURATION: 100 % | WEIGHT: 260 LBS | SYSTOLIC BLOOD PRESSURE: 160 MMHG | DIASTOLIC BLOOD PRESSURE: 78 MMHG

## 2019-07-15 DIAGNOSIS — T07.XXXA MULTIPLE BRUISES: Primary | ICD-10-CM

## 2019-07-15 PROCEDURE — 99281 EMR DPT VST MAYX REQ PHY/QHP: CPT

## 2019-07-15 NOTE — ED NOTES
Marco Mason is a 20 y.o. female that was discharged in stable condition. The patients diagnosis, condition and treatment were explained to  patient and aftercare instructions were given. The patient verbalized understanding. Patient armband removed and shredded.

## 2019-07-15 NOTE — ED PROVIDER NOTES
EMERGENCY DEPARTMENT HISTORY AND PHYSICAL EXAM    Date: 7/15/2019  Patient Name: Cherie Rutherford    History of Presenting Illness     Chief Complaint   Patient presents with    Other         History Provided By: Patient    Chief Complaint: Bruises  Duration: Several days  Timing: Acute  Location: Thighs    Additional History (Context): Cherie Rutherford is a 20 y.o. female with history of anxiety and obesity who presents with complaints of bruises to bilateral thighs that she noticed 2 to 3 days ago. She states that she has not fallen, bumped into anything and does not recall why she should have bruises. Thus, she decided she wanted to get medical attention to be evaluated. She denies using aspirin, any blood thinners, and expanding ecchymosis, fevers, chills, chest pain, shortness of breath, bug bites, itching. She denies any heavy bleeding. Denies any history of bleeding disorders in herself or in her family. Denies any recent antibiotic use or new changes in medicines. PCP: None        Past History     Past Medical History:  Past Medical History:   Diagnosis Date    Anxiety disorder     Chlamydia 05/2018    Personal history of mental disorder     Rash and other nonspecific skin eruption     Severely overweight        Past Surgical History:  History reviewed. No pertinent surgical history. Family History:  Family History   Problem Relation Age of Onset    Diabetes Father     Hypertension Father     Heart Disease Mother     No Known Problems Brother     No Known Problems Brother        Social History:  Social History     Tobacco Use    Smoking status: Never Smoker    Smokeless tobacco: Never Used   Substance Use Topics    Alcohol use: No    Drug use: No       Allergies:  No Known Allergies      Review of Systems   Review of Systems   Constitutional: Negative for chills and fever. HENT: Negative. Respiratory: Negative for chest tightness and shortness of breath.     Cardiovascular: Negative for chest pain, palpitations and leg swelling. Gastrointestinal: Negative for abdominal pain, diarrhea, nausea and vomiting. Genitourinary: Negative for dysuria and frequency. Musculoskeletal: Negative for arthralgias and back pain. Skin: Positive for color change. Bruises to bilateral thighs   Neurological: Negative. Hematological: Negative for adenopathy. Does not bruise/bleed easily. Psychiatric/Behavioral: Negative. Physical Exam     Vitals:    07/15/19 0941   BP: 160/78   Pulse: 71   Resp: 18   Temp: 98.6 °F (37 °C)   SpO2: 100%   Weight: 117.9 kg (260 lb)   Height: 5' 9\" (1.753 m)     Physical Exam   Constitutional: She is oriented to person, place, and time. She appears well-developed and well-nourished. No distress. HENT:   Head: Normocephalic and atraumatic. Eyes: Pupils are equal, round, and reactive to light. EOM are normal.   Neck: Neck supple. Cardiovascular: Normal rate and regular rhythm. Exam reveals no gallop and no friction rub. No murmur heard. Pulmonary/Chest: Effort normal and breath sounds normal. No respiratory distress. She has no wheezes. She has no rales. Abdominal: Soft. Bowel sounds are normal. She exhibits no distension and no mass. There is no tenderness. There is no rebound and no guarding. Musculoskeletal: Normal range of motion. She exhibits no tenderness or deformity. Lymphadenopathy:     She has no cervical adenopathy. Neurological: She is alert and oriented to person, place, and time. No cranial nerve deficit. Skin: Skin is warm and dry. She is not diaphoretic. There are multiple small ecchymotic regions to bilateral legs. They appear to be yellowing. The are not indurated, hot to touch, erythematous, or tender to palpation. There is no surrounding purpura or petechiae. No evidence of insect bites. No vesicles, no weeping wounds, no desquamation, no necrosis, no superimposed infection.    Psychiatric: She has a normal mood and affect. Her behavior is normal.   Nursing note and vitals reviewed. Diagnostic Study Results     Labs -   No results found for this or any previous visit (from the past 12 hour(s)). Radiologic Studies -   No orders to display         Medical Decision Making   I am the first provider for this patient. I reviewed the vital signs, available nursing notes, past medical history, past surgical history, family history and social history. Vital Signs-Reviewed the patient's vital signs. Records Reviewed: Nursing Notes and Old Medical Records    Procedures:  Procedures    Provider Notes (Medical Decision Making): Impression: Multiple bruisesthey appear to be healing. There is no expanding hematoma and none are greater than 2 cm in diameter. There is no superimposed infection, induration, erythema. She is not on blood thinners, does not have a family history of bleeding disorder. She denies any heavy bleeding. Do not think that she needs further lab work today. Her vital signs are stable. We will have her follow-up with primary care. Urged her to return if any fevers, expanding hematoma, chest pain, shortness of breath or any other concerns. Patient agrees with the plan. MED RECONCILIATION:  No current facility-administered medications for this encounter. No current outpatient medications on file. Disposition:  Discharge    DISCHARGE NOTE:     Pt has been reexamined. Patient has no new complaints, changes, or physical findings. Care plan outlined and precautions discussed. Results were reviewed with the patient. All medications were reviewed with the patient; will d/c home. All of pt's questions and concerns were addressed. Patient was instructed and agrees to follow up with PCP, as well as to return to the ED upon further deterioration. Patient is ready to go home.     Follow-up Information     Follow up With Specialties Details Why Contact Info    [facility] EMERGENCY DEPT Emergency Medicine  If symptoms worsen [address] [facility]  [address]  In 1 week for primary care follow up [address] [place] [address]  [phone]          There are no discharge medications for this patient. Diagnosis     Clinical Impression:   1.  Multiple bruises

## 2019-07-15 NOTE — ED TRIAGE NOTES
Patient states she has several bruises on right thigh. She is concerned because she doesn't recall bumping into anything.

## 2020-12-01 ENCOUNTER — OFFICE VISIT (OUTPATIENT)
Dept: ORTHOPEDIC SURGERY | Age: 25
End: 2020-12-01
Payer: COMMERCIAL

## 2020-12-01 VITALS
WEIGHT: 227.8 LBS | HEART RATE: 74 BPM | OXYGEN SATURATION: 97 % | SYSTOLIC BLOOD PRESSURE: 135 MMHG | RESPIRATION RATE: 16 BRPM | BODY MASS INDEX: 33.74 KG/M2 | HEIGHT: 69 IN | DIASTOLIC BLOOD PRESSURE: 74 MMHG | TEMPERATURE: 97.3 F

## 2020-12-01 DIAGNOSIS — S93.492A SPRAIN OF ANTERIOR TALOFIBULAR LIGAMENT OF LEFT ANKLE, INITIAL ENCOUNTER: ICD-10-CM

## 2020-12-01 DIAGNOSIS — M25.572 ACUTE LEFT ANKLE PAIN: Primary | ICD-10-CM

## 2020-12-01 PROCEDURE — 99203 OFFICE O/P NEW LOW 30 MIN: CPT | Performed by: ORTHOPAEDIC SURGERY

## 2020-12-01 PROCEDURE — 73610 X-RAY EXAM OF ANKLE: CPT | Performed by: ORTHOPAEDIC SURGERY

## 2020-12-01 RX ORDER — VENLAFAXINE HYDROCHLORIDE 150 MG/1
CAPSULE, EXTENDED RELEASE ORAL
COMMUNITY
Start: 2020-11-23 | End: 2021-11-16

## 2020-12-01 NOTE — LETTER
NOTIFICATION RETURN TO WORK / SCHOOL 
 
12/1/2020 10:34 AM 
 
Ms. Norberto Hernandez [address] [facility] [id number] To Whom It May Concern: 
 
Norberto Hernandez is currently under the care of [address] [place] [address] Romeo Navas. 
 
: Please allow Ms. Matty Hinkle to continue using electric chair for ambulation. I recommend light duty position with limited standing and walking. She will be reassessed after the MRI. If there are questions or concerns please have the patient contact our office.  
 
 
 
Sincerely, 
 
 
Jason Chu MD

## 2020-12-01 NOTE — PROGRESS NOTES
AMBULATORY PROGRESS NOTE      Patient: Katina Kidd             MRN: [id number]     SSN: [id number] Body mass index is 33.64 kg/m². YOB: 1995     AGE: 30 y.o. EX: female    PCP: None       IMPRESSION //  DIAGNOSIS AND TREATMENT PLAN      DIAGNOSES  1. Acute left ankle pain    2. Sprain of anterior talofibular ligament of left ankle, initial encounter        Orders Placed This Encounter    Generic Supply Order     short cam walker boot, left foot    [24137] Ankle Min 3V     Order Specific Question:   Weight bearing? Answer:   No    MRI ANKLE LT WO CONT     Standing Status:   Future     Standing Expiration Date:   6/1/2021     Order Specific Question:   Is Patient Pregnant? Answer:   No     Order Specific Question:   Arthrogram study     Answer:   No     Order Specific Question:   Reason for Exam     Answer:   to assess anterior talus and navicular for possible sprain    venlafaxine-SR (EFFEXOR-XR) 150 mg capsule     Sig: TK 1 C PO QAM UTD      As such in this individual, who is anterior ankle pain, with ascending and descending steps, and has focal tenderness to the anterior talus and dorsal anterior portion of the navicular, I will concern about the possibly of an occult fracture to the tarsal navicular. Recommendations for MRI of her left ankle to assess the talonavicular region the dorsal part of her talus dorsal part of her navicular bone on the left side. She reports me [facility], she had [facility] of her left ankle and left foot, she had the x-rays on a CD DVD, but she misplaced this. She states she was diagnosed with having a left ankle sprain, and a foot fracture, but she cannot tell where the foot fracture was located. Again she does not have a CD DVD, I do not have any reports of [facility] from [facility].    Her injury occurred when she was running on a treadmill, the local facility, approximately November 20, 2020. PLAN:    1.  MRI of left ankle to assess anterior talus and navicular for possible sprain  2. DME order: short cam walker boot, left foot //  the CAM boot was placed on patient in the office today  3. Work Note: Please allow Ms. Samantha Dean to continue using electric chair for ambulation. I recommend light duty position with limited standing and walking. She will be reassessed after     RTO- after MRI      HPI //  OBJECTIVE EXAMINATION      Sybil Hall IS A 30 y.o. female who presents to my outpatient office for evaluation of: ankle fx sustained from an injury that occurred on 11/21/2020. [facility] presented to [facility]/2020 where she had x-ray imaging done and was dx with a sprain and a fracture. She was also given an ankle brace, which she wears at work. Patient sustained the injury on Nov 20th while running on a level treadmill at [employer]. She states that she heard a pop when in injury occurred, but due to adrenaline did not feel pain immediately, She severe pain and swelling when she got home that day. Since the injury, she continues to endorse persistent pain to anterolateral ankle along anterior navicular region. She reports popping of her ankle when she walks up the steps into her home. Patient currently works as a grocery store. She states that her job has been accommodating in letting her use electric chair to ambulate.      Visit Vitals  /74 (BP 1 Location: Left arm, BP Patient Position: Sitting)   Pulse 74   Temp 97.3 °F (36.3 °C) (Temporal)   Resp 16   Ht 5' 9\" (1.753 m)   Wt 227 lb 12.8 oz (103.3 kg)   SpO2 97%   BMI 33.64 kg/m²       ANKLE/FOOT left    Psychiatry: Alert, oriented x 3 (name,place,time of day); speech normal in context and clarity, memory intact grossly, no involuntary movements - tremors, no dementia  Gait: antalgic  Tenderness: exquisite to anterolateral gutter, anterior navicular, pain with DF   Cutaneous: WNL  Joint Motion: WNL  Joint / Tendon Stability: No Ankle or Subtalar instability or joint laxity. No peroneal sublux ability or dislocation  Alignment: neutral Hindfoot, none Metatarsus Adductus Metatarsus. Neuro Motor/Sensory: NL/NL,  Vascular: NL foot/ankle pulses,   Lymphatics: No extremity lymphedema, No calf swelling, no tenderness to calf muscles. CHART REVIEW     Patient Active Problem List   Diagnosis Code    Arrested active phase of labor O62.1    Pregnancy Z34.90        Luke Steiner has been experiencing pain and discomfort confirmed as outlined in the pain assessment outlined below. Pain Assessment  12/1/2020   Location of Pain Ankle   Location Modifiers Left   Severity of Pain 6   Quality of Pain Popping;Dull; Luis Corpus; Throbbing;Aching   Quality of Pain Comment numbness and tingling   Duration of Pain Persistent   Frequency of Pain Constant   Date [facility] 11/21/2020   Aggravating Factors Stairs; Walking;Standing   Relieving Factors Nothing   Result of Injury Yes   Work-Related Injury No   Type of Injury (No Data)   Type of Injury Comment running on the treadmil        Luke Steiner  has a past medical history of Anxiety disorder, Chlamydia (05/2018), Personal history of mental disorder, Rash and other nonspecific skin eruption, and Severely overweight. Patients is employed at:         Past Medical History:   Diagnosis Date    Anxiety disorder     Chlamydia 05/2018    Personal history of mental disorder     Rash and other nonspecific skin eruption     Severely overweight      History reviewed. No pertinent surgical history. Current Outpatient Medications   Medication Sig    venlafaxine-SR (EFFEXOR-XR) 150 mg capsule TK 1 C PO QAM UTD     No current facility-administered medications for this visit. No Known Allergies  Social History     Occupational History    Not on file   Tobacco Use    Smoking status: Never Smoker    Smokeless tobacco: Never Used   Substance and Sexual Activity    Alcohol use:  Yes Alcohol/week: 5.0 standard drinks     Types: 5 Shots of liquor per week    Drug use: No    Sexual activity: Never     Family History   Problem Relation Age of Onset    Diabetes Father     Hypertension Father     Heart Disease Mother     No Known Problems Brother     No Known Problems Brother        THE  FOR Efrem Madden BY No Rene 12/1/2020 . DIAGNOSTIC IMAGING  LAB DATA      No results found for: HBA1C, HGBE8, XBN6EVFK, NKD4NFZJ //   Lab Results   Component Value Date/Time    Glucose 93 09/12/2020 01:55 PM        No results found for: MZA2OFAD, WRP0KVIV      No results found for: VITD3, XQVID2, XQVID3, XQVID, VD3RIA, [id number]      REVIEW OF SYSTEMS : 12/1/2020  ALL BELOW ARE Negative except : SEE HPI     CONSTITUTIONAL: No weight loss  PSYCHOLOGICAL : No Feelings of anxiety, depression, agitation  EYES: No blurred vision and no eye discharge. NO eye pain, double vision  ENT: No nasal discharge. No ear pain. CARDIOVASCULAR: No chest pain and no diaphoresis. RESPIRATORY: No cough, no hemoptysis. GI: No vomiting, no diarrhea   : No urinary frequency and no dysuria. MUSCULOSKELETAL: see HPI  SKIN: No rashes. NEURO:  No dizziness,weakness, headaches// No visual changes or confusion, or seizures,   ENDOCRINE: No polyphagia and no polydipsia. HEMATOLOGY: No bleeding tendencies. DIAGNOSTIC IMAGING        ANKLE X RAYS 3 VIEWS LEFT  X RAYS AT [address] [place] [address]  12/1/2020    NON WEIGHT BEARING    X RAYS AT [address] [place] [address]  12/1/2020    Bones: No fractures or dislocations. No focal osteolytic or osteoblastic process     Bone Spurs: No significant bone spurs  //there appears to be a small bony ossicle or possible bony fragment, seen the dorsal part of the navicular bone near the talus, nondisplaced nonangulated. I see no effusion of the ankle, this current time of these x-rays.   Alignment: Ankle mortise alignment is congruent, Tibial plafond and talar dome intact. No Osteochondral defects seen   Joint: No Significant OA changes present  Soft Tissues: Normal, No radiopaque foreign body     No abnormal calcific densities to soft tissues    No ankle joint effusion in lateral projection. Mineralization: Suggests no Osteopenia    I have personally reviewed the results of the above study.  The interpretation of this study is my professional opinion      Lily Fields  12/1/2020  9:01 AM

## 2020-12-02 DIAGNOSIS — M25.572 ACUTE LEFT ANKLE PAIN: ICD-10-CM

## 2020-12-02 DIAGNOSIS — S93.492A SPRAIN OF ANTERIOR TALOFIBULAR LIGAMENT OF LEFT ANKLE, INITIAL ENCOUNTER: ICD-10-CM

## 2020-12-17 ENCOUNTER — HOSPITAL ENCOUNTER (OUTPATIENT)
Dept: MRI IMAGING | Age: 25
Discharge: HOME | End: 2020-12-17
Attending: ORTHOPAEDIC SURGERY
Payer: COMMERCIAL

## 2020-12-17 PROCEDURE — 73721 MRI JNT OF LWR EXTRE W/O DYE: CPT

## 2021-01-11 ENCOUNTER — OFFICE VISIT (OUTPATIENT)
Dept: ORTHOPEDIC SURGERY | Age: 26
End: 2021-01-11
Payer: COMMERCIAL

## 2021-01-11 VITALS
OXYGEN SATURATION: 99 % | WEIGHT: 239.2 LBS | TEMPERATURE: 96.8 F | HEIGHT: 69 IN | HEART RATE: 60 BPM | BODY MASS INDEX: 35.43 KG/M2 | RESPIRATION RATE: 16 BRPM | DIASTOLIC BLOOD PRESSURE: 69 MMHG | SYSTOLIC BLOOD PRESSURE: 121 MMHG

## 2021-01-11 DIAGNOSIS — S93.492A SPRAIN OF ANTERIOR TALOFIBULAR LIGAMENT OF LEFT ANKLE, INITIAL ENCOUNTER: Primary | ICD-10-CM

## 2021-01-11 PROCEDURE — 99214 OFFICE O/P EST MOD 30 MIN: CPT | Performed by: ORTHOPAEDIC SURGERY

## 2021-01-11 NOTE — LETTER
NOTIFICATION RETURN TO WORK / SCHOOL 
 
1/11/2021 4:11 PM 
 
Ms. Malorie Flower 
[address] [facility] [address] 
 
 
To Whom It May Concern: 
 
Malorie Flower is currently under the care of [facility]. 
 
Please allow Ms. Flower to continue with same work restrictions: light duty position with limited standing and walking. 
 
If there are questions or concerns please have the patient contact our office. 
 
 
 
Sincerely, 
 
 
Alonso Balbuena MD

## 2021-01-11 NOTE — PROGRESS NOTES
AMBULATORY PROGRESS NOTE      Patient: Oumou Faustin             MRN: [id number]     SSN: [id number] Body mass index is 35.32 kg/m². YOB: 1995     AGE: 30 y.o. EX: female    PCP: None       IMPRESSION //  DIAGNOSIS AND TREATMENT PLAN      DIAGNOSES  1. Sprain of anterior talofibular ligament of left ankle, initial encounter        Orders Placed This Encounter    Generic Supply Order     L AS/AC ankle brace    REFERRAL TO PHYSICAL THERAPY     Referral Priority:   Routine     Referral Type:   PT/OT/ST     Referral Reason:   Specialty Services Required     Requested Specialty:   Physical Therapy     Number of Visits Requested:   1        I reviewed the MRI with her, the MRI report specifically. The MRI left ankle does show chronic tearing to the lateral ligamentous structures, looks a complete apparent rupture to the ATFL capsule ligamentous complex, old healed trauma to the CFL and PT FL ligaments. Several corticated osseous fragments of bony lucencies or bony ossific bodies along the posterior lateral aspect of the joint. There are some mild degenerative changes to the plantar fascia, mild plantar fasciitis, as well as moderate degenerative changes throughout the midfoot and hindfoot. Her discomfort, is been primarily on the medial side of her tarsal navicular and anterior medial portion of her anterior tibial tendon. The MRI and each of these distinct regions, were unremarkable. Impression this had ankle sprain left ankle sprain, recommendation, is for the plans listed as below    PLAN:    1. Referral to PT for gradual strengthening exercises of the left ankle. 2. DME order: L AS/AC ankle brace  3. Work Note: Please allow Ms. Víctor Bills to continue with same work restrictions: light duty position with limited standing and walking.      RTO- 1 month      HPI //  [address] [facility] [address] IS A 30 y.o. female who presents to my outpatient office for evaluation of: ankle fx sustained from an injury that occurred on 11/21/2020. At the last OV, I ordered an MRI of the left ankle to assess anterior talus and navicular for possible sprain, placed the patient into a short cam walker boot, and provided a work note: Please allow Ms. Tao Otoole to continue using electric chair for ambulation. I recommend light duty position with limited standing and walking. Since the last OV, Malorie Flower patient continues to endorse mild pain and tenderness to her dorsal medial and lateral hindfoot. She goes without the boot in her home, but notes that it is more painful without the boot than with. She is able to  the shower, but prolonged standing can worsen the pain. Patient currently works as a grocery store. She states that her job has been accommodating in letting her use electric chair to ambulate. Visit Vitals  /69 (BP 1 Location: Left arm, BP Patient Position: Sitting)   Pulse 60   Temp 96.8 °F (36 °C) (Temporal)   Resp 16   Ht 5' 9\" (1.753 m)   Wt 239 lb 3.2 oz (108.5 kg)   SpO2 99%   BMI 35.32 kg/m²       ANKLE/FOOT left    Psychiatry: Alert, oriented x 3 (name,place,time of day); speech normal in context and clarity, memory intact grossly, no involuntary movements - tremors, no dementia  Gait: antalgic  Tenderness: mild to anterolateral gutter, anterior navicular, pain with DF   Cutaneous: WNL  Joint Motion: WNL  Joint / Tendon Stability: No Ankle or Subtalar instability or joint laxity. No peroneal sublux ability or dislocation  Alignment: neutral Hindfoot, none Metatarsus Adductus Metatarsus. Neuro Motor/Sensory: NL/NL,  Vascular: NL foot/ankle pulses,   Lymphatics: No extremity lymphedema, No calf swelling, no tenderness to calf muscles.           CHART REVIEW     Patient Active Problem List   Diagnosis Code    Arrested active phase of labor O62.1    Pregnancy Z34.90        Juan Luis Dozier has been experiencing pain and discomfort confirmed as outlined in the pain assessment outlined below. Pain Assessment  1/11/2021   Location of Pain Foot   Location Modifiers Left   Severity of Pain 6   Quality of Pain (No Data)   Quality of Pain Comment pins and needles. numbness   Duration of Pain Persistent   Frequency of Pain Constant   Date [facility] -   Aggravating Factors Walking;Standing   Relieving Factors Nothing   Result of Injury No   Work-Related Injury -   Type of Injury -   Type of Injury Comment -        Malorie Braxton  has a past medical history of Anxiety disorder, Chlamydia (05/2018), Personal history of mental disorder, Rash and other nonspecific skin eruption, and Severely overweight. Patients is employed at:         Past Medical History:   Diagnosis Date    Anxiety disorder     Chlamydia 05/2018    Personal history of mental disorder     Rash and other nonspecific skin eruption     Severely overweight      History reviewed. No pertinent surgical history. Current Outpatient Medications   Medication Sig    venlafaxine-SR (EFFEXOR-XR) 150 mg capsule TK 1 C PO QAM UTD     No current facility-administered medications for this visit. No Known Allergies  Social History     Occupational History    Not on file   Tobacco Use    Smoking status: Never Smoker    Smokeless tobacco: Never Used   Substance and Sexual Activity    Alcohol use: Yes     Alcohol/week: 5.0 standard drinks     Types: 5 Shots of liquor per week    Drug use: No    Sexual activity: Never     Family History   Problem Relation Age of Onset    Diabetes Father     Hypertension Father     Heart Disease Mother     No Known Problems Brother     No Known Problems Brother        THE  FOR Lennie Murrell MD 1/11/2021 .       DIAGNOSTIC IMAGING  LAB DATA      No results found for: HBA1C, HGBE8, BRA7LTVG, RTT5KXGV //   Lab Results   Component Value Date/Time    Glucose 93 09/12/2020 01:55 PM        No results found for: DBR8CJHG, [address] [facility]      No results found for: VITD3, Enrigue Balboa, XQVID, VD3RIA, [id number]      REVIEW OF SYSTEMS : 1/11/2021  ALL BELOW ARE Negative except : SEE HPI     CONSTITUTIONAL: No weight loss  PSYCHOLOGICAL : No Feelings of anxiety, depression, agitation  EYES: No blurred vision and no eye discharge. NO eye pain, double vision  ENT: No nasal discharge. No ear pain. CARDIOVASCULAR: No chest pain and no diaphoresis. RESPIRATORY: No cough, no hemoptysis. GI: No vomiting, no diarrhea   : No urinary frequency and no dysuria. MUSCULOSKELETAL: see HPI  SKIN: No rashes. NEURO:  No dizziness,weakness, headaches// No visual changes or confusion, or seizures,   ENDOCRINE: No polyphagia and no polydipsia. HEMATOLOGY: No bleeding tendencies. DIAGNOSTIC IMAGING      MRI Results (most recent):  Results from Orders Only encounter on 12/02/20   MRI ANKLE LT WO CONT    Narrative EXAMINATION: MRI ANKLE LT WO CONT    CLINICAL INDICATION/HISTORY: to assess anterior talus and navicular for possible  sprain   >Additional: None    TECHNIQUE: T1 weighted sagittal, STIR sagittal, proton FSE axial, fat suppressed  T2 FSE axial, fat suppressed T2 FSE coronal, proton FSE oblique axial.    COMPARISON EXAMS: None.    _______________    FINDINGS:    ANKLE LIGAMENTS:  Anterior and posterior inferior tibiofibular ligaments and syndesmosis: Intact  Anterior and posterior talofibular and the calcaneofibular ligaments: The ATFL  is markedly attenuated, not clearly visualized and most likely completely torn  with little if any residual ligament fibers in place and a small stump of  ligament tissue noted at the distal fibular attachment. The CFL is mildly  irregular proximally but intact and may represent sequela of old healed trauma. The PT fell is thickened but intact.   Deltoid and spring ligaments: Intact    ANKLE AND FOOT TENDONS:  Lateral: Peroneus longus and brevis tendons: Intact and normal in signal  intensity. Medial: Posterior tibialis, flexor digitorum longus and flexor hallucis longus  tendons: Intact and normal in signal intensity. Extensor tendons: Intact and normal in signal intensity. Achilles tendon: Achilles tendon is normal in morphology and signal intensity. Minimal insertional enthesopathy. Kager's fat pad is unremarkable. No retrocalcaneal bursitis. OSSEOUS:  Tibiotalar and subtalar joints: Small joint effusion. Several small corticated  loose bodies are noted along the posterior lateral aspect of the joint with  surrounding joint fluid and degenerative irregularity along the posterior margin  of the talus No fracture or contusion. Intact talar dome and symmetric ankle  mortise. No degenerative osteoarthropathy of the ankle. Included midfoot articulations: Moderate degenerative changes noted at the  talonavicular joint with dorsal osteophyte formation and patchy edema. Small  marginal osteophytes also noted throughout the cuneiforms and tarsometatarsal  joints. OTHER:  Sinus tarsi: Normal fat signal intensity. Plantar fascia: Intact and normal signal intensity. Prominent plantar calcaneal  enthesophyte is noted. There is mild patchy overlying edema within the calcaneal  fat pad. Tarsal tunnel: Normal in appearance. Regional soft tissues: No abnormal soft tissue fluid collections. _______________      Impression IMPRESSION:    1. Evidence of chronic lateral ligamentous injury with apparent complete  rupture of the ATFL and old healed trauma to the CFL and PT fell. 2.  Several corticated ossific bodies along the posterior lateral aspect of the  joint may represent sequela of old trauma. 3.  Moderate degenerative changes of the midfoot/hindfoot. 4.  Mild plantar fasciitis. Please see above section of this report. I have personally reviewed the results of the above study. The interpretation of this study is my professional opinion.     Written by Jose Cruz Orellana, as dictated by Dr. Rafa Martins. I, Dr. Rafa Martins, confirm that all documentation is accurate.

## 2022-03-08 PROBLEM — O44.02 PLACENTA PREVIA ANTEPARTUM IN SECOND TRIMESTER: Status: ACTIVE | Noted: 2022-03-08

## 2022-03-18 PROBLEM — O44.02 PLACENTA PREVIA ANTEPARTUM IN SECOND TRIMESTER: Status: ACTIVE | Noted: 2022-03-08

## 2022-03-19 PROBLEM — Z34.90 PREGNANCY: Status: ACTIVE | Noted: 2018-12-02

## 2022-06-25 PROBLEM — Z34.93 PREGNANT AND NOT YET DELIVERED IN THIRD TRIMESTER: Status: ACTIVE | Noted: 2022-06-25

## 2023-01-30 RX ORDER — ERYTHROMYCIN 5 MG/G
OINTMENT OPHTHALMIC
COMMUNITY
Start: 2021-12-14